# Patient Record
Sex: MALE | Race: WHITE | NOT HISPANIC OR LATINO | URBAN - METROPOLITAN AREA
[De-identification: names, ages, dates, MRNs, and addresses within clinical notes are randomized per-mention and may not be internally consistent; named-entity substitution may affect disease eponyms.]

---

## 2019-08-05 ENCOUNTER — EMERGENCY (EMERGENCY)
Facility: HOSPITAL | Age: 54
LOS: 1 days | Discharge: HOME | End: 2019-08-05
Attending: EMERGENCY MEDICINE | Admitting: EMERGENCY MEDICINE

## 2019-08-05 VITALS
TEMPERATURE: 98 F | WEIGHT: 210.1 LBS | RESPIRATION RATE: 20 BRPM | DIASTOLIC BLOOD PRESSURE: 84 MMHG | OXYGEN SATURATION: 96 % | HEART RATE: 62 BPM | SYSTOLIC BLOOD PRESSURE: 131 MMHG

## 2019-08-05 DIAGNOSIS — R10.9 UNSPECIFIED ABDOMINAL PAIN: ICD-10-CM

## 2019-08-05 NOTE — ED ADULT NURSE NOTE - OBJECTIVE STATEMENT
Reports "having hematuria and left flank pain all day. Took percocet to help with pain. I was peeing all day and the stone didn't pass. Then they were taking my vital signs and I felt something shift and now I'm just sore but not having pain anymore." AA&Ox4. Breathing on room air. Denies fever or chills. Patient appears comfortable upon assessment.

## 2019-08-05 NOTE — ED PROVIDER NOTE - NSTIMEPROVIDERCAREINITIATE_GEN_ER
Heriberto Timoas   MRN: [id number]    Department:  [facility] in [place]   Date of Visit:  4/13/2019           Disclosure     Insurance plans vary and the physician(s) referred by the ER may not be covered by your plan.  Please contact y tell this physician (or your personal doctor if your instructions are to return to your personal doctor) about any new or lasting problems. The primary care or specialist physician will see patients referred from the [facility] Emergency Department.  Luciano Byrne 05-Aug-2019 18:34

## 2024-04-04 NOTE — ED ADULT TRIAGE NOTE - MEANS OF ARRIVAL
Assessment and Plan:  Problem List Items Addressed This Visit       Bartholomew's esophagus     Refer to GI check CBC magnesium H. pylori advised to get EGD colonoscopy         Mixed hyperlipidemia    Relevant Orders    CK    Lipid Panel    Tremor    Need for hepatitis C screening test    Relevant Orders    Hepatitis C antibody    Medicare annual wellness visit, subsequent - Primary    GERD without esophagitis    Relevant Orders    EGD    Magnesium    Benign prostatic hyperplasia without lower urinary tract symptoms    Relevant Orders    PSA, Total and Free    Bradycardia    Relevant Orders    TSH with reflex to Free T4 if abnormal         Chief Complaint:   Annual Medicare Wellness Office Exam/Comprehensive Problem Focused Follow Up and Physical Exam  Stomach discomfort tremor urinary discomfort asymptomatic bradycardia    HPI: This is a 69-year-old patient  without any children    Personal history of hyperlipidemia tremor gastritis anxiety found to bradycardia for more than 2 years without any symptoms seen by cardiology workup negative    Complaining of nocturia    Neck complaining of the stomach discomfort    Complaining of nonessential tremor    Negative for headache chest pain hematuria rectal bleeding or jaundice    Negative for COVID    Negative for anxiety depressive dementia or fall        Patient Active Problem List:  Patient Active Problem List   Diagnosis    Bartholomew's esophagus    Mixed hyperlipidemia    Tremor    Need for hepatitis C screening test    Medicare annual wellness visit, subsequent    GERD without esophagitis    Benign prostatic hyperplasia without lower urinary tract symptoms    Bradycardia          Comprehensive Medical/Surgical/Social/Family History:  Family History   Problem Relation Name Age of Onset    Heart disease Mother      Hypertension Mother      Other (agent orange) Father         Past Medical History:   Diagnosis Date    Anesthesia of skin 02/18/2020    Numbness and  tingling    Cellulitis of left finger 01/17/2022    Paronychia of left thumb    Disorder of thyroid, unspecified 06/01/2020    Thyroid mass    Encounter for immunization     Encounter for immunization    Encounter for other preprocedural examination 11/21/2019    Encounter for preadmission testing    Encounter for screening for cardiovascular disorders 05/13/2019    Encounter for abdominal aortic aneurysm (AAA) screening    Essential (primary) hypertension 07/23/2021    Benign essential hypertension    Generalized anxiety disorder 07/23/2021    ALVERTO (generalized anxiety disorder)    Local infection of the skin and subcutaneous tissue, unspecified 01/17/2022    Infection of thumb    Other chest pain 11/13/2018    Atypical chest pain    Personal history of other diseases of the digestive system 07/23/2021    History of colitis    Personal history of other diseases of the digestive system 12/17/2019    History of bilateral inguinal hernias    Personal history of other diseases of the digestive system 02/18/2020    History of diverticulitis of colon    Personal history of other diseases of the digestive system 01/17/2022    History of gastroesophageal reflux (GERD)    Personal history of other diseases of the musculoskeletal system and connective tissue 11/16/2018    History of muscle spasm    Personal history of other diseases of the respiratory system 02/18/2020    History of upper respiratory infection    Personal history of other diseases of the respiratory system     History of influenza    Personal history of other endocrine, nutritional and metabolic disease 02/18/2021    History of vitamin D deficiency    Personal history of other infectious and parasitic diseases 04/21/2020    History of herpes zoster    Personal history of other mental and behavioral disorders 01/17/2022    History of anxiety disorder    Personal history of other specified conditions 08/03/2020    History of weight gain    Personal history of  other specified conditions 05/15/2019    History of abnormal weight loss    Personal history of urinary calculi 2020    History of renal calculi    Sciatica, right side 09/10/2015    Right sided sciatica       Past Surgical History:   Procedure Laterality Date    OTHER SURGICAL HISTORY  2019    Colonoscopy    OTHER SURGICAL HISTORY  2019    Esophagogastroduodenoscopy    OTHER SURGICAL HISTORY  2022    Thyroid surgery    OTHER SURGICAL HISTORY  2019    Inguinal hernia repair       Social History     Socioeconomic History    Marital status: Single     Spouse name: None    Number of children: None    Years of education: None    Highest education level: None   Occupational History    None   Tobacco Use    Smoking status: Former     Types: Cigarettes     Quit date: 3/4/2019     Years since quittin.0    Smokeless tobacco: Never   Substance and Sexual Activity    Alcohol use: Never    Drug use: Never    Sexual activity: None   Other Topics Concern    None   Social History Narrative    None     Social Determinants of Health     Financial Resource Strain: Not on file   Food Insecurity: Not on file   Transportation Needs: Not on file   Physical Activity: Not on file   Stress: Not on file   Social Connections: Not on file   Intimate Partner Violence: Not on file   Housing Stability: Not on file       Tobacco/Alcohol/Opioid use, as well as Illicit Drug Use was screened for/reviewed and documented in Social Documentation section of the chart and medication list as appropriate    Allergies and Medications  No Known Allergies  Current Outpatient Medications   Medication Sig Dispense Refill    atorvastatin (Lipitor) 80 mg tablet Take 1 tablet (80 mg) by mouth once daily. 90 tablet 3    cholecalciferol (Vitamin D-3) 25 MCG (1000 UT) tablet Take 2 tablets (50 mcg) by mouth once daily.      multivit with minerals/lutein (MULTIVITAMIN 50 PLUS ORAL) 1 tabs, ORAL, DAILY, in the afternoon, # 100 tabs,  Refill(s) 0, Date: 06/03/2020 12:31:00 EDT      omeprazole (PriLOSEC) 40 mg DR capsule Take 1 capsule (40 mg) by mouth 4 times a week. 90 capsule 3     No current facility-administered medications for this visit.       Medications and Supplements  prescribed by me and other practitioners or clinical pharmacist (such as prescriptions, OTC's, herbal therapies and supplements) were reviewed and documented in the medical record.     Advance directives  Advanced Care Planning (including a Living Will, Healthcare POA, as well as specific end of life choices and/or directives), was discussed for approximately 16 minutes with the patient and/or surrogate, voluntarily, and documented in the Problem List of the medical record.     Cardiac Risk Assessment  Cardiovascular risk was discussed and, if needed, lifestyle modifications recommended, including nutritional choices, exercise, and elimination of habits contributing to risk. We agreed on a plan to reduce the current cardiovascular risk based on above discussion as needed.  Aspirin use/disuse was discussed and documented in the Problem List of the medical record after reviewing the updated guidelines below:    Consider low dose Aspirin ( mg) use if the benefit for cardiovascular disease prevention outweighs risk for bleeding complications.   In general, low dose ASA should be considered:  In patients WITHOUT prior MI/stroke/PAD (primary prevention):   a. Age <60: Use if 10-year cardiovascular disease risk >20%, with discussion of risks and benefits with patient  b. Age 60-<70: Use if 10-year cardiovascular disease risk >20% and low bleeding (e.g., gastrointenstinal) risk, with discussion of risks and benefits with patient  c. Age >=70: Do not use    In patients WITH prior MI/stroke/PAD (secondary prevention):   Generally use unless extremely high bleeding (e.g., gastrointenstinal) risk, with discussion of risks and benefits with patient    ROS otherwise negative aside  "from what was mentioned above in HPI.    Visit Vitals  /70 (BP Location: Left arm, Patient Position: Sitting)   Pulse (!) 46   Temp 36.1 °C (97 °F)   Ht 1.753 m (5' 9\")   Wt 73.5 kg (162 lb)   SpO2 99%   BMI 23.92 kg/m²   Smoking Status Former   BSA 1.89 m²       Physical Exam  Physical Exam:    Appearance: Alert, oriented , cooperative,  in no acute distress. Well nourished & well hydrated.    Skin: Intact,  dry skin, no lesions, rash, petechiae or purpura.     Eyes: PERRLA, EOMs intact,  Conjunctiva pink with no redness or exudates. Cornea & anterior chamber are clear, Eyelids without lesions. No scleral icterus.     ENT: Hearing grossly intact. External auditory canals patent, tympanic membranes intact with visible landmarks. Nares patent, mucus membranes moist. Dentition without lesions. Pharynx clear, uvula midline.     Neck: Supple, without meningismus. Thyroid not palpable. Trachea at midline. No lymphadenopathy.    Pulmonary: Rhonchi tremor both and    Cardiac: Sinus bradycardia arthritis of shoulder  Abdomen: Soft, nontender, active bowel sounds.  No palpable organomegaly.  No rebound or guarding.  No CVA tenderness.    Genitourinary: Exam deferred.    Musculoskeletal: Arthritis of shoulder.    Neurological: Tremor both hands.    Psychiatric: Appropriate mood and affect.         During the course of the visit the patient was educated and counseled about age appropriate screening and preventive services. Completed preventive screenings were documented in the chart and orders were placed for outstanding screenings/procedures as documented in the Assessment and Plan.    Patient Instructions (the written plan) was given to the patient at check out.    Charting was completed using voice recognition technology and may include unintended errors.  " ambulatory